# Patient Record
Sex: MALE | Race: OTHER | URBAN - METROPOLITAN AREA
[De-identification: names, ages, dates, MRNs, and addresses within clinical notes are randomized per-mention and may not be internally consistent; named-entity substitution may affect disease eponyms.]

---

## 2021-08-28 ENCOUNTER — TELEPHONE ENCOUNTER (OUTPATIENT)
Dept: URBAN - METROPOLITAN AREA CLINIC 13 | Facility: CLINIC | Age: 51
End: 2021-08-28

## 2021-08-29 ENCOUNTER — TELEPHONE ENCOUNTER (OUTPATIENT)
Dept: URBAN - METROPOLITAN AREA CLINIC 13 | Facility: CLINIC | Age: 51
End: 2021-08-29

## 2022-01-05 ENCOUNTER — OFFICE VISIT (OUTPATIENT)
Dept: URBAN - NONMETROPOLITAN AREA CLINIC 9 | Facility: CLINIC | Age: 52
End: 2022-01-05
Payer: SELF-PAY

## 2022-01-05 ENCOUNTER — LAB OUTSIDE AN ENCOUNTER (OUTPATIENT)
Dept: URBAN - NONMETROPOLITAN AREA CLINIC 9 | Facility: CLINIC | Age: 52
End: 2022-01-05

## 2022-01-05 VITALS
SYSTOLIC BLOOD PRESSURE: 158 MMHG | WEIGHT: 174.9 LBS | OXYGEN SATURATION: 98 % | BODY MASS INDEX: 24.48 KG/M2 | DIASTOLIC BLOOD PRESSURE: 81 MMHG | HEIGHT: 71 IN | HEART RATE: 54 BPM

## 2022-01-05 DIAGNOSIS — Z12.11 COLON CANCER SCREENING: ICD-10-CM

## 2022-01-05 DIAGNOSIS — R10.32 LLQ PAIN: ICD-10-CM

## 2022-01-05 DIAGNOSIS — K21.9 GASTROESOPHAGEAL REFLUX DISEASE WITHOUT ESOPHAGITIS: ICD-10-CM

## 2022-01-05 DIAGNOSIS — R19.8 CHANGE IN BOWEL MOVEMENT: ICD-10-CM

## 2022-01-05 DIAGNOSIS — R14.0 ABDOMINAL BLOATING: ICD-10-CM

## 2022-01-05 PROCEDURE — 99204 OFFICE O/P NEW MOD 45 MIN: CPT | Performed by: NURSE PRACTITIONER

## 2022-01-05 RX ORDER — BIOTIN 10 MG
1 TABLET TABLET ORAL ONCE A DAY
Status: ACTIVE | COMMUNITY

## 2022-01-05 RX ORDER — LOSARTAN POTASSIUM 100 MG/1
1 TABLET TABLET ORAL ONCE A DAY
Status: ACTIVE | COMMUNITY

## 2022-01-05 RX ORDER — DICYCLOMINE HYDROCHLORIDE 10 MG/1
1 CAPSULE CAPSULE ORAL
Qty: 60 | Refills: 0 | OUTPATIENT

## 2022-01-05 RX ORDER — AMLODIPINE BESYLATE 5 MG/1
1 TABLET TABLET ORAL ONCE A DAY
Status: ACTIVE | COMMUNITY

## 2022-01-05 RX ORDER — OMEPRAZOLE 20 MG/1
1 CAPSULE 30 MINUTES BEFORE MORNING MEAL CAPSULE, DELAYED RELEASE ORAL ONCE A DAY
Status: ACTIVE | COMMUNITY

## 2022-01-05 NOTE — HPI-TODAY'S VISIT:
The patient was referred by Dr. Gallo Trevino for a colon consult .  A copy of this document is being forwarded to the referring provider. The patient has had increased gas and epigastric to right abdominal pain.  In October 2021 he had his second COVID shot. He began having increased gas and epigastric pain. Denies NSAID use. Currently he takes daily Metamucil. Bowel movements are daily of formed stool but has some constipation. Hemorrhoids have been bothersome.  The patient has GERD for which he has taken Omeprazole 20mg for years. He is active and runs 4 times a week. In September, he was on antibiotics for a respiratory infection. His paternal grandparents had colon cancer. He had allergy testing at age 40 and was told by allergist that he may have a sensitivity to wheat. He adheres to a gluten free diet and feel it helps. About 10 years ago, he had an EGD. He had H. Pylori in 1992 and took treatment.

## 2022-01-27 ENCOUNTER — TELEPHONE ENCOUNTER (OUTPATIENT)
Dept: URBAN - METROPOLITAN AREA CLINIC 46 | Facility: CLINIC | Age: 52
End: 2022-01-27

## 2022-01-28 PROBLEM — 235595009: Status: ACTIVE | Noted: 2022-01-28

## 2022-02-07 PROBLEM — 305058001: Status: ACTIVE | Noted: 2022-01-05

## 2022-02-07 PROBLEM — 88111009: Status: ACTIVE | Noted: 2022-01-05

## 2022-02-14 ENCOUNTER — CLAIMS CREATED FROM THE CLAIM WINDOW (OUTPATIENT)
Dept: URBAN - METROPOLITAN AREA CLINIC 4 | Facility: CLINIC | Age: 52
End: 2022-02-14
Payer: SELF-PAY

## 2022-02-14 ENCOUNTER — OFFICE VISIT (OUTPATIENT)
Dept: URBAN - METROPOLITAN AREA SURGERY CENTER 28 | Facility: SURGERY CENTER | Age: 52
End: 2022-02-14
Payer: SELF-PAY

## 2022-02-14 DIAGNOSIS — K63.5 BENIGN COLON POLYP: ICD-10-CM

## 2022-02-14 DIAGNOSIS — R10.13 ABDOMINAL DISCOMFORT, EPIGASTRIC: ICD-10-CM

## 2022-02-14 DIAGNOSIS — K57.30 ACQUIRED DIVERTICULOSIS OF COLON: ICD-10-CM

## 2022-02-14 DIAGNOSIS — J38.2 NODULES OF VOCAL CORDS: ICD-10-CM

## 2022-02-14 DIAGNOSIS — K63.89 GASEOUS DISTENTION OF INTESTINE DETERMINED BY X-RAY: ICD-10-CM

## 2022-02-14 DIAGNOSIS — R14.0 ABDOMINAL BLOATING: ICD-10-CM

## 2022-02-14 DIAGNOSIS — K31.89 ACQUIRED DEFORMITY OF DUODENUM: ICD-10-CM

## 2022-02-14 DIAGNOSIS — K31.89 FOCAL FOVEOLAR HYPERPLASIA: ICD-10-CM

## 2022-02-14 PROCEDURE — G8907 PT DOC NO EVENTS ON DISCHARG: HCPCS | Performed by: INTERNAL MEDICINE

## 2022-02-14 PROCEDURE — 88312 SPECIAL STAINS GROUP 1: CPT | Performed by: PATHOLOGY

## 2022-02-14 PROCEDURE — 88342 IMHCHEM/IMCYTCHM 1ST ANTB: CPT | Performed by: PATHOLOGY

## 2022-02-14 PROCEDURE — 88305 TISSUE EXAM BY PATHOLOGIST: CPT | Performed by: PATHOLOGY

## 2022-02-14 PROCEDURE — 45385 COLONOSCOPY W/LESION REMOVAL: CPT | Performed by: INTERNAL MEDICINE

## 2022-02-14 PROCEDURE — 43239 EGD BIOPSY SINGLE/MULTIPLE: CPT | Performed by: INTERNAL MEDICINE

## 2022-02-14 RX ORDER — LOSARTAN POTASSIUM 100 MG/1
1 TABLET TABLET ORAL ONCE A DAY
Status: ACTIVE | COMMUNITY

## 2022-02-14 RX ORDER — BIOTIN 10 MG
1 TABLET TABLET ORAL ONCE A DAY
Status: ACTIVE | COMMUNITY

## 2022-02-14 RX ORDER — OMEPRAZOLE 20 MG/1
1 CAPSULE 30 MINUTES BEFORE MORNING MEAL CAPSULE, DELAYED RELEASE ORAL ONCE A DAY
Status: ACTIVE | COMMUNITY

## 2022-02-14 RX ORDER — DICYCLOMINE HYDROCHLORIDE 10 MG/1
1 CAPSULE CAPSULE ORAL
Qty: 60 | Refills: 0 | Status: ACTIVE | COMMUNITY

## 2022-02-14 RX ORDER — AMLODIPINE BESYLATE 5 MG/1
1 TABLET TABLET ORAL ONCE A DAY
Status: ACTIVE | COMMUNITY

## 2022-03-28 ENCOUNTER — OFFICE VISIT (OUTPATIENT)
Dept: URBAN - METROPOLITAN AREA CLINIC 48 | Facility: CLINIC | Age: 52
End: 2022-03-28

## 2022-03-30 ENCOUNTER — OFFICE VISIT (OUTPATIENT)
Dept: URBAN - NONMETROPOLITAN AREA CLINIC 9 | Facility: CLINIC | Age: 52
End: 2022-03-30
Payer: SELF-PAY

## 2022-03-30 ENCOUNTER — OFFICE VISIT (OUTPATIENT)
Dept: URBAN - NONMETROPOLITAN AREA CLINIC 9 | Facility: CLINIC | Age: 52
End: 2022-03-30

## 2022-03-30 ENCOUNTER — DASHBOARD ENCOUNTERS (OUTPATIENT)
Age: 52
End: 2022-03-30

## 2022-03-30 VITALS
BODY MASS INDEX: 24.9 KG/M2 | DIASTOLIC BLOOD PRESSURE: 88 MMHG | HEIGHT: 71 IN | SYSTOLIC BLOOD PRESSURE: 135 MMHG | WEIGHT: 177.9 LBS | HEART RATE: 50 BPM | OXYGEN SATURATION: 98 %

## 2022-03-30 DIAGNOSIS — K21.9 GASTROESOPHAGEAL REFLUX DISEASE WITHOUT ESOPHAGITIS: ICD-10-CM

## 2022-03-30 DIAGNOSIS — Z80.0 FAMILY HISTORY OF COLON CANCER: ICD-10-CM

## 2022-03-30 DIAGNOSIS — R10.32 LLQ PAIN: ICD-10-CM

## 2022-03-30 DIAGNOSIS — Q31.8 VOCAL CORD ANOMALY: ICD-10-CM

## 2022-03-30 DIAGNOSIS — R14.0 ABDOMINAL BLOATING: ICD-10-CM

## 2022-03-30 PROBLEM — 116289008: Status: ACTIVE | Noted: 2022-01-05

## 2022-03-30 PROBLEM — 72292009: Status: ACTIVE | Noted: 2022-02-14

## 2022-03-30 PROBLEM — 301716002: Status: ACTIVE | Noted: 2022-01-05

## 2022-03-30 PROBLEM — 266435005: Status: ACTIVE | Noted: 2022-02-07

## 2022-03-30 PROCEDURE — 99213 OFFICE O/P EST LOW 20 MIN: CPT | Performed by: PHYSICIAN ASSISTANT

## 2022-03-30 PROCEDURE — 99213 OFFICE O/P EST LOW 20 MIN: CPT | Performed by: INTERNAL MEDICINE

## 2022-03-30 RX ORDER — BIOTIN 10 MG
1 TABLET TABLET ORAL ONCE A DAY
Status: ACTIVE | COMMUNITY

## 2022-03-30 RX ORDER — LOSARTAN POTASSIUM 100 MG/1
1 TABLET TABLET ORAL ONCE A DAY
Status: ACTIVE | COMMUNITY

## 2022-03-30 RX ORDER — OMEPRAZOLE 20 MG/1
1 CAPSULE 30 MINUTES BEFORE MORNING MEAL CAPSULE, DELAYED RELEASE ORAL ONCE A DAY
Status: ACTIVE | COMMUNITY

## 2022-03-30 RX ORDER — DICYCLOMINE HYDROCHLORIDE 10 MG/1
1 CAPSULE CAPSULE ORAL
Qty: 60 | Refills: 0 | Status: ON HOLD | COMMUNITY

## 2022-03-30 RX ORDER — CHOLECALCIFEROL (VITAMIN D3) 50 MCG
1 TABLET TABLET ORAL ONCE A DAY
Status: ACTIVE | COMMUNITY

## 2022-03-30 RX ORDER — AMLODIPINE BESYLATE 5 MG/1
1 TABLET TABLET ORAL ONCE A DAY
Status: ACTIVE | COMMUNITY

## 2022-03-30 NOTE — HPI-TODAY'S VISIT:
Perez Ozuna is a 51 y/o male who presents for follow up. He was seen in January for screening colonoscopy as well as evaluation of GERD, abdominal pain/bloating, and occasional constipation. He was advised to take probiotic and Miralax. He has been on Omeprazole 20 mg BID long term for GERD. EGD/Colon revealed a R vocal cord nodule for which he was referred to ENT for further evaluation, diverticulosis, removal of a hyperplastic polyp, and internal hemorrhoids. Gastric Bx was negative for H. pylori and suggestive of PPI effect, and SB biopsy was normal. He returns today and has seen Dr. Hernandez who is monitoring vocal cord nodule which is felt to be benign; surgery offered but patient is self pay, so put off for now. He overall feels well regarding GI related symptomology. He is having a daily bowel movement on Metamucil. Abdominal pain and bloating has resolved with probiotic and a 30 day course of Dicyclomine BID which he is no longer taking. No new concerns.